# Patient Record
Sex: FEMALE | Race: WHITE | NOT HISPANIC OR LATINO | URBAN - METROPOLITAN AREA
[De-identification: names, ages, dates, MRNs, and addresses within clinical notes are randomized per-mention and may not be internally consistent; named-entity substitution may affect disease eponyms.]

---

## 2017-06-16 ENCOUNTER — EMERGENCY (EMERGENCY)
Facility: HOSPITAL | Age: 47
LOS: 1 days | Discharge: HOME | End: 2017-06-16
Attending: STUDENT IN AN ORGANIZED HEALTH CARE EDUCATION/TRAINING PROGRAM | Admitting: STUDENT IN AN ORGANIZED HEALTH CARE EDUCATION/TRAINING PROGRAM
Payer: COMMERCIAL

## 2017-06-16 VITALS
TEMPERATURE: 98 F | OXYGEN SATURATION: 99 % | HEIGHT: 64 IN | HEART RATE: 83 BPM | WEIGHT: 119.93 LBS | RESPIRATION RATE: 16 BRPM | SYSTOLIC BLOOD PRESSURE: 121 MMHG | DIASTOLIC BLOOD PRESSURE: 82 MMHG

## 2017-06-16 DIAGNOSIS — R07.9 CHEST PAIN, UNSPECIFIED: ICD-10-CM

## 2017-06-16 PROCEDURE — 99284 EMERGENCY DEPT VISIT MOD MDM: CPT

## 2017-06-17 VITALS
TEMPERATURE: 98 F | SYSTOLIC BLOOD PRESSURE: 118 MMHG | OXYGEN SATURATION: 100 % | DIASTOLIC BLOOD PRESSURE: 70 MMHG | RESPIRATION RATE: 18 BRPM | HEART RATE: 82 BPM

## 2017-06-17 PROCEDURE — 99284 EMERGENCY DEPT VISIT MOD MDM: CPT | Mod: 25

## 2017-06-17 PROCEDURE — 72040 X-RAY EXAM NECK SPINE 2-3 VW: CPT

## 2017-06-17 PROCEDURE — 71046 X-RAY EXAM CHEST 2 VIEWS: CPT

## 2017-06-17 PROCEDURE — 96372 THER/PROPH/DIAG INJ SC/IM: CPT

## 2017-06-17 PROCEDURE — 72070 X-RAY EXAM THORAC SPINE 2VWS: CPT

## 2017-06-17 PROCEDURE — 72070 X-RAY EXAM THORAC SPINE 2VWS: CPT | Mod: 26

## 2017-06-17 PROCEDURE — 72040 X-RAY EXAM NECK SPINE 2-3 VW: CPT | Mod: 26

## 2017-06-17 PROCEDURE — 71020: CPT | Mod: 26

## 2017-06-17 RX ORDER — KETOROLAC TROMETHAMINE 30 MG/ML
60 SYRINGE (ML) INJECTION ONCE
Qty: 0 | Refills: 0 | Status: DISCONTINUED | OUTPATIENT
Start: 2017-06-17 | End: 2017-06-17

## 2017-06-17 RX ADMIN — Medication 60 MILLIGRAM(S): at 03:54

## 2017-06-17 RX ADMIN — Medication 60 MILLIGRAM(S): at 03:37
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
## 2017-06-17 NOTE — ED ADULT NURSE NOTE - CHPI ED SYMPTOMS NEG
no loss of consciousness/no neck tenderness/no sleeping issues/no laceration/no disorientation/no crying/no difficulty bearing weight/no dizziness/no bruising/no decreased eating/drinking/no headache/no fussiness
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
## 2017-06-17 NOTE — ED PROVIDER NOTE - MUSCULOSKELETAL, MLM
Spine appears normal, range of motion is not limited, no muscle or joint tenderness. No T/L spine tenderness, step-off, or deformity

## 2017-06-17 NOTE — ED PROVIDER NOTE - PROGRESS NOTE DETAILS
Patient advised NSAIDS, rest, ice, Will be sore x 48 hours. Return if no improvement. Friend to take patient home
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
## 2021-01-29 NOTE — ED ADULT NURSE NOTE - CAS DISCH ACCOMP BY
Alert-The patient is alert, awake and responds to voice. The patient is oriented to time, place, and person. The triage nurse is able to obtain subjective information. Family

## 2022-11-27 NOTE — ED PROVIDER NOTE - SEVERITY
PAIN SCALE 3 OF 10. no sore throat, no ear pain, no abdominal pain, no nausea/no diarrhea/no fever/no vomiting

## 2024-06-24 NOTE — ED ADULT TRIAGE NOTE - NSWEIGHTCALCTOOLDRUG_GEN_A_CORE
Initiate Treatment: ketoconazole 2 % shampoo \\nSig: Use to shampoo scalp 2-3x weekly or as needed when flared.\\n\\nfluocinonide 0.05 % topical solution \\nSig: Apply to spots on scalp QD PRN flare Detail Level: Zone Render In Strict Bullet Format?: No  used Plan: Discussed with pt to incorporate Adapalene OTC retinol into her regimen that may cause dryness. Counseled pt to moisturize on top of adapalene. Initiate Treatment: Dapsone topical cream once a day to face.